# Patient Record
Sex: MALE | Race: WHITE
[De-identification: names, ages, dates, MRNs, and addresses within clinical notes are randomized per-mention and may not be internally consistent; named-entity substitution may affect disease eponyms.]

---

## 2020-07-25 ENCOUNTER — HOSPITAL ENCOUNTER (EMERGENCY)
Dept: HOSPITAL 77 - KA.ED | Age: 45
Discharge: HOME | End: 2020-07-25
Payer: COMMERCIAL

## 2020-07-25 DIAGNOSIS — Z79.82: ICD-10-CM

## 2020-07-25 DIAGNOSIS — Z79.899: ICD-10-CM

## 2020-07-25 DIAGNOSIS — K29.00: Primary | ICD-10-CM

## 2020-07-25 LAB
ANION GAP SERPL CALC-SCNC: 15.2 MMOL/L (ref 5–15)
CHLORIDE SERPL-SCNC: 104 MMOL/L (ref 98–115)
SODIUM SERPL-SCNC: 142 MMOL/L (ref 136–145)

## 2020-07-25 PROCEDURE — 99284 EMERGENCY DEPT VISIT MOD MDM: CPT

## 2020-07-25 PROCEDURE — 83690 ASSAY OF LIPASE: CPT

## 2020-07-25 PROCEDURE — 85025 COMPLETE CBC W/AUTO DIFF WBC: CPT

## 2020-07-25 PROCEDURE — 80053 COMPREHEN METABOLIC PANEL: CPT

## 2020-07-25 PROCEDURE — 36415 COLL VENOUS BLD VENIPUNCTURE: CPT

## 2020-07-25 RX ADMIN — ALUMINUM HYDROXIDE, MAGNESIUM HYDROXIDE, AND SIMETHICONE ONE ML: 200; 200; 20 SUSPENSION ORAL at 20:22

## 2020-07-25 NOTE — EDM.PDOC
ED HPI GENERAL MEDICAL PROBLEM





- General


Chief Complaint: Abdominal Pain


Stated Complaint: ABDOMINAL PAIN


Time Seen by Provider: 07/25/20 20:27


Source of Information: Reports: Patient


History Limitations: Reports: No Limitations





- History of Present Illness


INITIAL COMMENTS - FREE TEXT/NARRATIVE: 





Patient is a 44-year-old gentleman who presents to the emergency department this

evening via private vehicle with a complaint of abdominal pain.  Patient states 

that this is a chronic condition.  It happens intermittently, however, after 

eating this evening he felt an ache in his upper abdomen.  He took a Zantac and 

ibuprofen and symptoms did not resolve, so decided to present to the ER.  He's 

had similar symptoms in the past, but it went away quickly.  Patient denies 

chest pain, shortness of breath, nausea, vomiting, diarrhea, dysuria, testicular

pain, flank pain, fever, blood in stool, out of area travel, or concern for 

covid exposure.


Onset: Gradual


Duration: Chronic


Location: Reports: Abdomen


Quality: Reports: Ache


Severity: Mild


Improves with: Reports: Other (Spontaneously)


Worsens with: Reports: Eating


Associated Symptoms: Reports: No Other Symptoms.  Denies: Chest Pain, 

Fever/Chills, Nausea/Vomiting, Shortness of Breath





- Related Data


                                    Allergies











Allergy/AdvReac Type Severity Reaction Status Date / Time


 


No Known Drug Allergies Allergy  Cannot Verified 07/25/20 20:59





   Remember  











Home Meds: 


                                    Home Meds





Aspirin [Halfprin] 81 mg PO DAILY 07/25/20 [History]


Flaxseed Oil 1 cap PO DAILY 07/25/20 [History]


Losartan [Cozaar] 50 mg PO DAILY 07/25/20 [History]


Montelukast [Singulair] 10 mg PO BEDTIME 07/25/20 [History]


Omega-3 Fatty Acids/Fish Oil [Fish Oil 1,000 mg Softgel] 1,000 mg PO DAILY 

07/25/20 [History]


Omeprazole 20 mg PO QAM 07/25/20 [History]


Simvastatin 20 mg PO BEDTIME 07/25/20 [History]


amLODIPine [Norvasc] 5 mg PO DAILY 07/25/20 [History]


hydroCHLOROthiazide [Hydrochlorothiazide] 12.5 mg PO DAILY 07/25/20 [History]











ED ROS GENERAL





- Review of Systems


Review Of Systems: Comprehensive ROS is negative, except as noted in HPI.


Constitutional: Reports: No Symptoms


HEENT: Reports: No Symptoms


Respiratory: Reports: No Symptoms


Cardiovascular: Reports: No Symptoms


Endocrine: Reports: No Symptoms


GI/Abdominal: Reports: Abdominal Pain


: Reports: No Symptoms


Musculoskeletal: Reports: No Symptoms


Skin: Reports: No Symptoms


Neurological: Reports: No Symptoms


Psychiatric: Reports: No Symptoms


Hematologic/Lymphatic: Reports: No Symptoms


Immunologic: Reports: No Symptoms





ED EXAM, GI/ABD





- Physical Exam


Exam: See Below


Exam Limited By: No Limitations


General Appearance: Alert, WD/WN, No Apparent Distress


Nose: Normal Inspection, Normal Mucosa, No Blood


Throat/Mouth: Normal Inspection, Normal Oropharynx, No Airway Compromise


Head: Atraumatic, Normocephalic


Neck: Normal Inspection, Thyromegaly


Respiratory/Chest: No Respiratory Distress, Lungs Clear, No Accessory Muscle 

Use, Chest Non-Tender


Cardiovascular: Regular Rate, Rhythm, No Murmur


GI/Abdominal Exam: Normal Bowel Sounds, Soft, Non-Tender, No Organomegaly, No 

Distention, No Abnormal Bruit, No Mass, Other (No abdominal discomfort while in 

the emergency department.)


 (Male) Exam: No Hernia, Normal Inspection


Back Exam: Normal Inspection.  No: CVA Tenderness (L), CVA Tenderness (R)


Extremities: Normal Inspection


Neurological: Alert


Psychiatric: Normal Affect, Normal Mood


Skin Exam: Warm, Dry, Intact, Normal Color, No Rash





Course





- Vital Signs


Last Recorded V/S: 


                                Last Vital Signs











Temp  97.1 F   07/25/20 20:29


 


Pulse  93   07/25/20 20:29


 


Resp  20   07/25/20 20:29


 


BP  160/90 H  07/25/20 20:29


 


Pulse Ox  95   07/25/20 20:29














- Orders/Labs/Meds


Labs: 


                                Laboratory Tests











  07/25/20 07/25/20 Range/Units





  20:30 20:30 


 


WBC  8.42   (5.00-10.00)  10^3/uL


 


RBC  5.92   (4.50-6.00)  10^6/uL


 


Hgb  17.4 H   (13.0-17.0)  g/dL


 


Hct  48.1   (40.0-52.0)  %


 


MCV  81.3 L   (82.0-92.0)  fL


 


MCH  29.4   (27.0-31.0)  pg


 


MCHC  36.2 H   (32.0-36.0)  g/dL


 


RDW  12.5   (11.5-14.5)  %


 


Plt Count  239   (150-400)  10^3/uL


 


MPV  10.4   (7.4-10.4)  fL


 


Immature Gran % (Auto)  0.1   (0.0-5.0)  %


 


Neut % (Auto)  60.6   (50.0-70.0)  %


 


Lymph % (Auto)  22.9   (20.0-40.0)  %


 


Mono % (Auto)  8.9 H   (2.0-8.0)  %


 


Eos % (Auto)  6.8 H   (1.0-3.0)  %


 


Baso % (Auto)  0.7   (0.0-1.0)  %


 


Neut # (Auto)  5.10   (2.50-7.00)  10^3/uL


 


Lymph # (Auto)  1.93   (1.00-4.00)  10^3/uL


 


Mono # (Auto)  0.75   (0.10-0.80)  10^3/uL


 


Eos # (Auto)  0.57 H   (0.10-0.30)  10^3/uL


 


Baso # (Auto)  0.06   (0.00-0.10)  10^3/uL


 


Immature Gran # (Auto)  0.01   (0.00-0.50)  10^3/uL


 


Sodium   142  (136-145)  mmol/L


 


Potassium   3.6  (3.3-5.3)  mmol/L


 


Chloride   104  ()  mmol/L


 


Carbon Dioxide   26.4  (21.0-32.0)  mmol/L


 


Anion Gap   15.2 H  (5-15)  mmol/L


 


BUN   17  (6-25)  mg/dL


 


Creatinine   0.84  (0.51-1.17)  mg/dL


 


Est Cr Clr Drug Dosing   123.17  mL/min


 


Estimated GFR (MDRD)   > 60  mL/min


 


Glucose   113 H (75 - 99) mg/dL


 


Calcium   8.7  (8.7-10.3)  mg/dL


 


Total Bilirubin   0.4  (0.2-1.0)  mg/dL


 


AST   28  (15-37)  U/L


 


ALT   54  (12-78)  U/L


 


Alkaline Phosphatase   100  ()  IU/L


 


Total Protein   8.0  (6.4-8.2)  g/dL


 


Albumin   4.10  (3.00-4.80)  g/dL


 


Lipase   87  ()  U/L











Meds: 


Medications














Discontinued Medications














Generic Name Dose Route Start Last Admin





  Trade Name Lester  PRN Reason Stop Dose Admin


 


Al Hydroxide/Mg Hydroxide 30  0 ml  07/25/20 20:17  07/25/20 20:22





  ml/ Lidocaine HCl 15 ml  PO  07/25/20 20:18  45 ml





  ONETIME ONE   Administration














- Re-Assessments/Exams


Free Text/Narrative Re-Assessment/Exam: 





07/25/20 21:14


Patient afebrile, vital signs stable, no discomfort in ER.  Patient given GI 

cocktail.  Patient will follow-up with PCP on Monday





Departure





- Departure


Time of Disposition: 21:15


Disposition: Home, Self-Care 01


Clinical Impression: 


Gastritis


Qualifiers:


 Gastritis type: unspecified gastritis Chronicity: acute Gastritis bleeding: 

without bleeding Qualified Code(s): K29.00 - Acute gastritis without bleeding








- Discharge Information


Instructions:  Gastritis, Adult, Easy-to-Read


Referrals: 


Paulina Dc PA-C [Primary Care Provider] - 


Forms:  ED Department Discharge


Additional Instructions: 


Follow-up at [facility] on Monday.  Return to emergency department sooner 

symptoms continue or worsen.





Sepsis Event Note (ED)





- Focused Exam


Vital Signs: 


                                   Vital Signs











  Temp Pulse Resp BP Pulse Ox


 


 07/25/20 20:29  97.1 F  93  20  160/90 H  95














- Assessment/Plan


Assessment:: 





Gastritis


Plan: 





Follow-up with PCP